# Patient Record
Sex: MALE | Race: WHITE | Employment: FULL TIME | URBAN - METROPOLITAN AREA
[De-identification: names, ages, dates, MRNs, and addresses within clinical notes are randomized per-mention and may not be internally consistent; named-entity substitution may affect disease eponyms.]

---

## 2018-04-27 ENCOUNTER — TELEPHONE (OUTPATIENT)
Dept: INTERNAL MEDICINE | Age: 38
End: 2018-04-27

## 2018-04-27 DIAGNOSIS — E55.9 VITAMIN D DEFICIENCY: ICD-10-CM

## 2018-04-27 DIAGNOSIS — Z00.00 WELL ADULT HEALTH CHECK: Primary | ICD-10-CM

## 2018-05-01 DIAGNOSIS — Z00.00 WELL ADULT HEALTH CHECK: ICD-10-CM

## 2018-05-01 DIAGNOSIS — E55.9 VITAMIN D DEFICIENCY: ICD-10-CM

## 2018-05-01 LAB
A/G RATIO: 1.6 (ref 1.1–2.2)
ALBUMIN SERPL-MCNC: 4.5 G/DL (ref 3.4–5)
ALP BLD-CCNC: 74 U/L (ref 40–129)
ALT SERPL-CCNC: 22 U/L (ref 10–40)
ANION GAP SERPL CALCULATED.3IONS-SCNC: 16 MMOL/L (ref 3–16)
AST SERPL-CCNC: 23 U/L (ref 15–37)
BASOPHILS ABSOLUTE: 0 K/UL (ref 0–0.2)
BASOPHILS RELATIVE PERCENT: 0.5 %
BILIRUB SERPL-MCNC: 0.5 MG/DL (ref 0–1)
BILIRUBIN URINE: NEGATIVE
BLOOD, URINE: NEGATIVE
BUN BLDV-MCNC: 15 MG/DL (ref 7–20)
CALCIUM SERPL-MCNC: 9.7 MG/DL (ref 8.3–10.6)
CHLORIDE BLD-SCNC: 103 MMOL/L (ref 99–110)
CHOLESTEROL, TOTAL: 236 MG/DL (ref 0–199)
CLARITY: CLEAR
CO2: 25 MMOL/L (ref 21–32)
COLOR: YELLOW
CREAT SERPL-MCNC: 0.9 MG/DL (ref 0.9–1.3)
EOSINOPHILS ABSOLUTE: 0.2 K/UL (ref 0–0.6)
EOSINOPHILS RELATIVE PERCENT: 2.7 %
GFR AFRICAN AMERICAN: >60
GFR NON-AFRICAN AMERICAN: >60
GLOBULIN: 2.8 G/DL
GLUCOSE BLD-MCNC: 101 MG/DL (ref 70–99)
GLUCOSE URINE: NEGATIVE MG/DL
HCT VFR BLD CALC: 43.6 % (ref 40.5–52.5)
HDLC SERPL-MCNC: 49 MG/DL (ref 40–60)
HEMOGLOBIN: 15.3 G/DL (ref 13.5–17.5)
KETONES, URINE: NEGATIVE MG/DL
LDL CHOLESTEROL CALCULATED: 165 MG/DL
LEUKOCYTE ESTERASE, URINE: NEGATIVE
LYMPHOCYTES ABSOLUTE: 2.4 K/UL (ref 1–5.1)
LYMPHOCYTES RELATIVE PERCENT: 42 %
MCH RBC QN AUTO: 31.2 PG (ref 26–34)
MCHC RBC AUTO-ENTMCNC: 35.1 G/DL (ref 31–36)
MCV RBC AUTO: 88.8 FL (ref 80–100)
MICROSCOPIC EXAMINATION: NORMAL
MONOCYTES ABSOLUTE: 0.4 K/UL (ref 0–1.3)
MONOCYTES RELATIVE PERCENT: 7.5 %
NEUTROPHILS ABSOLUTE: 2.7 K/UL (ref 1.7–7.7)
NEUTROPHILS RELATIVE PERCENT: 47.3 %
NITRITE, URINE: NEGATIVE
PDW BLD-RTO: 14 % (ref 12.4–15.4)
PH UA: 7
PLATELET # BLD: 248 K/UL (ref 135–450)
PMV BLD AUTO: 8.5 FL (ref 5–10.5)
POTASSIUM SERPL-SCNC: 4.8 MMOL/L (ref 3.5–5.1)
PROTEIN UA: NEGATIVE MG/DL
RBC # BLD: 4.91 M/UL (ref 4.2–5.9)
SODIUM BLD-SCNC: 144 MMOL/L (ref 136–145)
SPECIFIC GRAVITY UA: 1.02
TOTAL PROTEIN: 7.3 G/DL (ref 6.4–8.2)
TRIGL SERPL-MCNC: 111 MG/DL (ref 0–150)
TSH SERPL DL<=0.05 MIU/L-ACNC: 1.4 UIU/ML (ref 0.27–4.2)
URINE TYPE: NORMAL
UROBILINOGEN, URINE: 0.2 E.U./DL
VITAMIN D 25-HYDROXY: 32.4 NG/ML
VLDLC SERPL CALC-MCNC: 22 MG/DL
WBC # BLD: 5.7 K/UL (ref 4–11)

## 2018-05-10 ENCOUNTER — OFFICE VISIT (OUTPATIENT)
Dept: INTERNAL MEDICINE | Age: 38
End: 2018-05-10

## 2018-05-10 VITALS
HEIGHT: 74 IN | DIASTOLIC BLOOD PRESSURE: 86 MMHG | SYSTOLIC BLOOD PRESSURE: 122 MMHG | BODY MASS INDEX: 29.26 KG/M2 | WEIGHT: 228 LBS

## 2018-05-10 DIAGNOSIS — Z00.00 ANNUAL PHYSICAL EXAM: Primary | ICD-10-CM

## 2018-05-10 DIAGNOSIS — L40.9 PSORIASIS: ICD-10-CM

## 2018-05-10 DIAGNOSIS — E79.0 ELEVATED URIC ACID IN BLOOD: ICD-10-CM

## 2018-05-10 DIAGNOSIS — J30.1 CHRONIC SEASONAL ALLERGIC RHINITIS DUE TO POLLEN: ICD-10-CM

## 2018-05-10 DIAGNOSIS — E78.00 PURE HYPERCHOLESTEROLEMIA: ICD-10-CM

## 2018-05-10 PROCEDURE — 99395 PREV VISIT EST AGE 18-39: CPT | Performed by: HOSPITALIST

## 2018-05-10 ASSESSMENT — PATIENT HEALTH QUESTIONNAIRE - PHQ9
2. FEELING DOWN, DEPRESSED OR HOPELESS: 0
SUM OF ALL RESPONSES TO PHQ QUESTIONS 1-9: 0
2. FEELING DOWN, DEPRESSED OR HOPELESS: 0
SUM OF ALL RESPONSES TO PHQ QUESTIONS 1-9: 0
SUM OF ALL RESPONSES TO PHQ9 QUESTIONS 1 & 2: 0
SUM OF ALL RESPONSES TO PHQ9 QUESTIONS 1 & 2: 0
1. LITTLE INTEREST OR PLEASURE IN DOING THINGS: 0
1. LITTLE INTEREST OR PLEASURE IN DOING THINGS: 0

## 2018-05-10 ASSESSMENT — ENCOUNTER SYMPTOMS
GASTROINTESTINAL NEGATIVE: 1
RESPIRATORY NEGATIVE: 1
EYES NEGATIVE: 1

## 2019-05-28 ENCOUNTER — OFFICE VISIT (OUTPATIENT)
Dept: INTERNAL MEDICINE CLINIC | Age: 39
End: 2019-05-28
Payer: COMMERCIAL

## 2019-05-28 VITALS
DIASTOLIC BLOOD PRESSURE: 88 MMHG | BODY MASS INDEX: 28.88 KG/M2 | SYSTOLIC BLOOD PRESSURE: 120 MMHG | HEIGHT: 74 IN | WEIGHT: 225 LBS

## 2019-05-28 DIAGNOSIS — S41.111D LACERATION OF RIGHT UPPER EXTREMITY, SUBSEQUENT ENCOUNTER: Primary | ICD-10-CM

## 2019-05-28 PROCEDURE — 99213 OFFICE O/P EST LOW 20 MIN: CPT | Performed by: INTERNAL MEDICINE

## 2019-05-28 ASSESSMENT — PATIENT HEALTH QUESTIONNAIRE - PHQ9
1. LITTLE INTEREST OR PLEASURE IN DOING THINGS: 0
SUM OF ALL RESPONSES TO PHQ QUESTIONS 1-9: 0
SUM OF ALL RESPONSES TO PHQ QUESTIONS 1-9: 0
SUM OF ALL RESPONSES TO PHQ9 QUESTIONS 1 & 2: 0
2. FEELING DOWN, DEPRESSED OR HOPELESS: 0

## 2019-05-28 NOTE — PROGRESS NOTES
CHIEF COMPLAINT: Brittany Esteban is a 42 y.o. male who presents for : Suture removal    HPI: Patient presented with follow-up of a laceration of his right forearm he had sutures placed he now is 12 days out for suture removal there is no fever chills or evidence of infection    Review of Systems:   Constitutional:  Denies fever or chills   Eyes:  Denies change in visual acuity   HENT:  Denies nasal congestion or sore throat   Respiratory:  Denies cough or shortness of breath   Cardiovascular:  Denies chest pain or edema   GI:  Denies abdominal pain, nausea, vomiting, bloody stools or diarrhea   :  Denies dysuria   Musculoskeletal:  Denies back pain or joint pain   Integument:  Denies rash   Neurologic:  Denies headache, focal weakness or sensory changes   Endocrine:  Denies polyuria or polydipsia   Lymphatic:  Denies swollen glands   Psychiatric:  Denies depression or anxiety     Past Medical History:        Diagnosis Date    Arthritis of knee, left 5/29/2012    Psoriasis        Past Surgical History:        Procedure Laterality Date    KNEE ARTHROSCOPY Left        Family History:  Family History   Problem Relation Age of Onset    Heart Disease Father     High Cholesterol Father     High Blood Pressure Father     High Blood Pressure Brother     Other Father         impaired fasting glucose       Social History:  Social History     Socioeconomic History    Marital status:      Spouse name: None    Number of children: 2    Years of education: None    Highest education level: None   Occupational History    None   Social Needs    Financial resource strain: None    Food insecurity:     Worry: None     Inability: None    Transportation needs:     Medical: None     Non-medical: None   Tobacco Use    Smoking status: Never Smoker    Smokeless tobacco: Never Used   Substance and Sexual Activity    Alcohol use:  Yes     Alcohol/week: 10.8 oz     Types: 4 Cans of beer, 14 Standard drinks or equivalent per week    Drug use: None    Sexual activity: Yes     Partners: Female   Lifestyle    Physical activity:     Days per week: None     Minutes per session: None    Stress: None   Relationships    Social connections:     Talks on phone: None     Gets together: None     Attends Sabianist service: None     Active member of club or organization: None     Attends meetings of clubs or organizations: None     Relationship status: None    Intimate partner violence:     Fear of current or ex partner: None     Emotionally abused: None     Physically abused: None     Forced sexual activity: None   Other Topics Concern    None   Social History Narrative    None         Allergies:  Patient has no known allergies. Current Medications:    Prior to Admission medications    Medication Sig Start Date End Date Taking? Authorizing Provider   Flaxseed, Linseed, (FLAXSEED OIL PO) Take by mouth   Yes Historical Provider, MD   Multiple Vitamins-Minerals (MULTIVITAMIN PO) Take by mouth   Yes Historical Provider, MD       Physical Exam:  Vital Signs: /88   Ht 6' 1.5\" (1.867 m)   Wt 225 lb (102.1 kg)   BMI 29.28 kg/m²   General: Patient appears  non-toxic  HENT: Atraumatic, normocephalic, oral mucosa moist  Lungs:  Clear bilaterally  Heart: Regular rate and rhythm  Abdomen: Non-distended, soft, non-tender  Extremities: No edema well he had a laceration of the right forearm  Neuro: Nonfocal    Medical Decision Making and Plan:  Pertinent Labs & Imaging studies reviewed.  (See chart for details)      Sutures were removed in the area was surgery Steri-Stripped as there is evidence of a slight And he was instructed to keep the Steri-Strips on for at least a week

## 2020-06-01 ENCOUNTER — TELEPHONE (OUTPATIENT)
Dept: INTERNAL MEDICINE CLINIC | Age: 40
End: 2020-06-01

## 2020-06-01 NOTE — TELEPHONE ENCOUNTER
The patient will be going to the lab and requesting orders, Plz advise    [facility]    [phone]

## 2020-06-02 LAB
A/G RATIO: 1.5 (ref 1–2.1)
ALBUMIN SERPL-MCNC: 4.1 G/DL (ref 3.5–5)
ALP BLD-CCNC: 72 U/L (ref 33–140)
ALT SERPL-CCNC: 22 U/L (ref 0–60)
ANION GAP SERPL CALCULATED.3IONS-SCNC: 9 MMOL/L (ref 5–13)
AST SERPL-CCNC: 24 U/L (ref 0–40)
BASOPHILS ABSOLUTE: 0 10*3/UL (ref 0–0.2)
BASOPHILS RELATIVE PERCENT: 0.3 %
BILIRUB SERPL-MCNC: 0.7 MG/DL (ref 0.2–1.2)
BILIRUBIN URINE: NEGATIVE
BUN / CREAT RATIO: 11
BUN BLDV-MCNC: 12 MG/DL (ref 7–25)
CALCIUM SERPL-MCNC: 9.2 MG/DL (ref 8.4–10.5)
CHLORIDE BLD-SCNC: 107 MMOL/L (ref 98–110)
CHOLESTEROL, TOTAL: 229 MG/DL (ref 125–199)
CHOLESTEROL/HDL RATIO: 4.6 (ref 0–5)
CLARITY: CLEAR
CO2: 23 MMOL/L (ref 22–29)
COLOR: YELLOW
CREAT SERPL-MCNC: 1.1 MG/DL (ref 0.5–1.3)
EOSINOPHILS ABSOLUTE: 0.2 10*3/UL (ref 0–0.5)
EOSINOPHILS RELATIVE PERCENT: 4 %
ERYTHROCYTES URINE: NEGATIVE
GFR AFRICAN AMERICAN: 91 SEE NOTE
GFR NON-AFRICAN AMERICAN: 75 SEE NOTE
GLOBULIN: 2.7 G/DL (ref 2–3.7)
GLUCOSE BLD-MCNC: 105 MG/DL (ref 70–99)
GLUCOSE URINE: NEGATIVE MG/DL
GRANULOCYTE ABSOLUTE COUNT: 2.3 10*3/UL (ref 1.5–7.8)
HCT VFR BLD CALC: 41.6 % (ref 38.5–50)
HDLC SERPL-MCNC: 50 MG/DL (ref 40–180)
HEMOGLOBIN: 14.6 G/DL (ref 13.2–17.1)
KETONES, URINE: NEGATIVE MG/DL
LDL CHOLESTEROL CALCULATED: 156 MG/DL (ref 0–100)
LEUKOCYTE ESTERASE, URINE: NEGATIVE
LYMPHOCYTES ABSOLUTE: 2.1 10*3/UL (ref 0.8–3.9)
LYMPHOCYTES RELATIVE PERCENT: 42.8 %
MCH RBC QN AUTO: 31.8 PG (ref 27–33)
MCHC RBC AUTO-ENTMCNC: 35.2 G/DL (ref 32–36)
MCV RBC AUTO: 90.4 FL (ref 80–100)
MONOCYTES ABSOLUTE: 0.3 10*3/UL (ref 0.2–0.9)
MONOCYTES RELATIVE PERCENT: 6.7 %
NITRITE, URINE: NEGATIVE
PDW BLD-RTO: 13.7 % (ref 11–15)
PH UA: 7 (ref 5–8)
PLATELET # BLD: 225 10*3/UL (ref 140–400)
PMV BLD AUTO: 8.3 FL (ref 7.5–11.5)
POTASSIUM SERPL-SCNC: 4.5 MMOL/L (ref 3.5–5.1)
PROTEIN UA: NEGATIVE MG/DL
RBC # BLD: 4.6 10*6/UL (ref 4.2–5.8)
SEGMENTED NEUTROPHILS RELATIVE PERCENT: 46.2 %
SODIUM BLD-SCNC: 139 MMOL/L (ref 135–146)
SPECIFIC GRAVITY UA: 1.02 (ref 1–1.03)
TOTAL PROTEIN: 6.8 G/DL (ref 6–8)
TRIGL SERPL-MCNC: 115 MG/DL (ref 0–150)
TSH ULTRASENSITIVE: 1.54 UIU/ML (ref 0.35–4.94)
UROBILINOGEN, URINE: <2 MG/DL
WBC # BLD: 5 10*3/UL (ref 3.8–10.8)

## 2020-06-04 ENCOUNTER — OFFICE VISIT (OUTPATIENT)
Dept: INTERNAL MEDICINE CLINIC | Age: 40
End: 2020-06-04
Payer: COMMERCIAL

## 2020-06-04 VITALS
WEIGHT: 228 LBS | TEMPERATURE: 97.8 F | HEART RATE: 61 BPM | SYSTOLIC BLOOD PRESSURE: 125 MMHG | HEIGHT: 75 IN | BODY MASS INDEX: 28.35 KG/M2 | DIASTOLIC BLOOD PRESSURE: 80 MMHG

## 2020-06-04 PROBLEM — E78.00 PURE HYPERCHOLESTEROLEMIA: Status: ACTIVE | Noted: 2020-06-04

## 2020-06-04 PROBLEM — R73.9 HYPERGLYCEMIA: Status: ACTIVE | Noted: 2020-06-04

## 2020-06-04 PROCEDURE — 99395 PREV VISIT EST AGE 18-39: CPT | Performed by: INTERNAL MEDICINE

## 2020-06-04 ASSESSMENT — PATIENT HEALTH QUESTIONNAIRE - PHQ9
SUM OF ALL RESPONSES TO PHQ QUESTIONS 1-9: 0
SUM OF ALL RESPONSES TO PHQ QUESTIONS 1-9: 0
1. LITTLE INTEREST OR PLEASURE IN DOING THINGS: 0
2. FEELING DOWN, DEPRESSED OR HOPELESS: 0
SUM OF ALL RESPONSES TO PHQ9 QUESTIONS 1 & 2: 0

## 2020-06-04 NOTE — PROGRESS NOTES
Annual Wellness Visit     Patient:  Marcus Khan                                               : 1980  Age: 47 y.o. MRN: <[id number]>  Date : 2020      CHIEF COMPLAINT: Marcus Khan is a 47 y.o. male who presents for : Physical exam    1. Pure hypercholesterolemia  Problem is stable continues to watch diet and exercise  - Internal Referral to Dietitian    2.  Hyperglycemia  Diet and exercise  See dietitian  - Internal Referral to Dietitian  Physical exam feels good denies any chest pain shortness of breath or any      Patient Active Problem List    Diagnosis Date Noted    Pure hypercholesterolemia 2020    Hyperglycemia 2020    Vitamin D deficiency 2012    Gout 2012    Arthritis of knee, left 2012    Psoriasis 2011       Constitutional:  Denies fever or chills   Eyes:  Denies change in visual acuity   HENT:  Denies nasal congestion or sore throat   Respiratory:  Denies cough or shortness of breath   Cardiovascular:  Denies chest pain or edema   GI:  Denies abdominal pain, nausea, vomiting, bloody stools or diarrhea   :  Denies dysuria   Musculoskeletal:  Denies back pain or joint pain   Integument:  Denies rash   Neurologic:  Denies headache, focal weakness or sensory changes   Endocrine:  Denies polyuria or polydipsia   Lymphatic:  Denies swollen glands   Psychiatric:  Denies depression or anxiety     Past Medical History:        Diagnosis Date    Arthritis of knee, left 2012    Hyperglycemia 2020    Psoriasis     Pure hypercholesterolemia 2020       Past Surgical History:        Procedure Laterality Date    KNEE ARTHROSCOPY Left     VASECTOMY         Family History:  Family History   Problem Relation Age of Onset    Heart Disease Father     High Cholesterol Father     High Blood Pressure Father     High Blood Pressure Brother     Other Father         impaired fasting glucose       Social History:  Social History     Socioeconomic History    Marital status:      Spouse name: None    Number of children: 2    Years of education: None    Highest education level: None   Occupational History    None   Social Needs    Financial resource strain: None    Food insecurity     Worry: None     Inability: None    Transportation needs     Medical: None     Non-medical: None   Tobacco Use    Smoking status: Never Smoker    Smokeless tobacco: Never Used   Substance and Sexual Activity    Alcohol use: Yes     Alcohol/week: 18.0 standard drinks     Types: 4 Cans of beer, 14 Standard drinks or equivalent per week    Drug use: None    Sexual activity: Yes     Partners: Female   Lifestyle    Physical activity     Days per week: None     Minutes per session: None    Stress: None   Relationships    Social connections     Talks on phone: None     Gets together: None     Attends Pentecostalism service: None     Active member of club or organization: None     Attends meetings of clubs or organizations: None     Relationship status: None    Intimate partner violence     Fear of current or ex partner: None     Emotionally abused: None     Physically abused: None     Forced sexual activity: None   Other Topics Concern    None   Social History Narrative    None         Allergies:  Patient has no known allergies. Current Medications:    Prior to Admission medications    Medication Sig Start Date End Date Taking? Authorizing Provider   Multiple Vitamins-Minerals (MULTIVITAMIN PO) Take by mouth   Yes Historical Provider, MD           Physical Exam:      Constitutional:  Well developed, well nourished, no acute distress, non-toxic appearance   Eyes:  PERRL, conjunctiva normal   HENT:  Atraumatic, external ears normal, nose normal, oropharynx moist, no pharyngeal exudates.  Neck- normal range of motion, no tenderness, supple   Respiratory:  No respiratory distress, normal breath sounds, no rales, no wheezing   Cardiovascular:  Normal rate, normal rhythm, no murmurs, no gallops, no rubs   GI:  Soft, nondistended, normal bowel sounds, nontender, no organomegaly, no mass, no rebound, no guarding   :  No costovertebral angle tenderness   Musculoskeletal:  No edema, no tenderness, no deformities. Back- no tenderness  Integument:  Well hydrated, no rash   Lymphatic:  No lymphadenopathy noted   Neurologic:  Alert & oriented x 3, CN 2-12 normal, normal motor function, normal sensory function, no focal deficits noted   Psychiatric:  Speech and behavior appropriate       Vitals: /80   Pulse 61   Temp 97.8 °F (36.6 °C)   Ht 6' 2.5\" (1.892 m)   Wt 228 lb (103.4 kg)   BMI 28.88 kg/m²     Body mass index is 28.88 kg/m². Wt Readings from Last 3 Encounters:   06/04/20 228 lb (103.4 kg)   05/28/19 225 lb (102.1 kg)   05/10/18 228 lb (103.4 kg)         LABS:    CBC:   Lab Results   Component Value Date    WBC 5.0 06/02/2020    HGB 14.6 06/02/2020    HCT 41.6 06/02/2020    MCV 90.4 06/02/2020     06/02/2020         No results found for: IRON, TIBC, FERRITIN, FOLATE, [id number], PTH                                                          BMP:    Lab Results   Component Value Date     06/02/2020    K 4.5 06/02/2020     06/02/2020    CO2 23 06/02/2020       LFT's:   Lab Results   Component Value Date    ALT 22 06/02/2020    AST 24 06/02/2020    ALKPHOS 72 06/02/2020    BILITOT 0.7 06/02/2020       Lipids:   Lab Results   Component Value Date    CHOL 229 (H) 06/02/2020    HDL 50 06/02/2020    LDLCALC 156 (H) 06/02/2020    TRIG 115 06/02/2020       INR: No results found for: INR, PROTIME    U/A:  Lab Results   Component Value Date    LABMICR Not Indicated 05/01/2018        No results found for: LABA1C     Lab Results   Component Value Date    CREATININE 1.10 06/02/2020       -----------------------------------------------------------------     Assessment/Plan:   1.  Pure hypercholesterolemia  Continue diet and exercise  - Internal Referral to Dietitian    2.  Hyperglycemia  Continue diet and exercise referral to dietitian  - Internal Referral to Dietitian  Physical exam check above labs continue diet and exercise  Up-to-date on his immunizations

## 2021-06-01 DIAGNOSIS — Z00.00 WELL ADULT EXAM: Primary | ICD-10-CM

## 2021-06-03 LAB
A/G RATIO: 1.3 (ref 1–2.1)
ALBUMIN SERPL-MCNC: 4.1 G/DL (ref 3.5–5)
ALP BLD-CCNC: 77 U/L (ref 33–140)
ALT SERPL-CCNC: 22 U/L (ref 0–60)
ANION GAP SERPL CALCULATED.3IONS-SCNC: 9 MMOL/L (ref 5–13)
AST SERPL-CCNC: 21 U/L (ref 0–40)
BASOPHILS ABSOLUTE: 0 10*3/UL (ref 0–0.2)
BASOPHILS RELATIVE PERCENT: 0.4 %
BILIRUB SERPL-MCNC: 0.5 MG/DL (ref 0.2–1.2)
BUN / CREAT RATIO: 12
BUN BLDV-MCNC: 13 MG/DL (ref 7–25)
CALCIUM SERPL-MCNC: 9.2 MG/DL (ref 8.4–10.5)
CHLORIDE BLD-SCNC: 105 MMOL/L (ref 98–110)
CHOLESTEROL, TOTAL: 201 MG/DL (ref 125–199)
CHOLESTEROL/HDL RATIO: 3.9 (ref 0–5)
CO2: 27 MMOL/L (ref 22–29)
CREAT SERPL-MCNC: 1.09 MG/DL (ref 0.5–1.3)
EOSINOPHILS ABSOLUTE: 0.2 10*3/UL (ref 0–0.5)
EOSINOPHILS RELATIVE PERCENT: 3.5 %
GFR AFRICAN AMERICAN: 91 SEE NOTE
GFR NON-AFRICAN AMERICAN: 75 SEE NOTE
GLOBULIN: 3.1 G/DL (ref 2–3.7)
GLUCOSE BLD-MCNC: 92 MG/DL (ref 71–99)
GRANULOCYTE ABSOLUTE COUNT: 2.5 10*3/UL (ref 1.5–7.8)
HCT VFR BLD CALC: 42.4 % (ref 38.5–50)
HDLC SERPL-MCNC: 51 MG/DL (ref 40–180)
HEMOGLOBIN: 14.7 G/DL (ref 13.2–17.1)
LDL CHOLESTEROL CALCULATED: 136 MG/DL (ref 0–100)
LYMPHOCYTES ABSOLUTE: 2.6 10*3/UL (ref 0.8–3.9)
LYMPHOCYTES RELATIVE PERCENT: 45.1 %
MCH RBC QN AUTO: 31.3 PG (ref 27–33)
MCHC RBC AUTO-ENTMCNC: 34.7 G/DL (ref 32–36)
MCV RBC AUTO: 90.3 FL (ref 80–100)
MONOCYTES ABSOLUTE: 0.4 10*3/UL (ref 0.2–0.9)
MONOCYTES RELATIVE PERCENT: 7.3 %
PDW BLD-RTO: 14 % (ref 11–15)
PLATELET # BLD: 271 10*3/UL (ref 140–400)
PMV BLD AUTO: 8.8 FL (ref 7.5–11.5)
POTASSIUM SERPL-SCNC: 4.5 MMOL/L (ref 3.5–5.1)
RBC # BLD: 4.7 10*6/UL (ref 4.2–5.8)
SEGMENTED NEUTROPHILS RELATIVE PERCENT: 43.7 %
SODIUM BLD-SCNC: 141 MMOL/L (ref 135–146)
TOTAL PROTEIN: 7.2 G/DL (ref 6–8)
TRIGL SERPL-MCNC: 71 MG/DL (ref 0–150)
TSH ULTRASENSITIVE: 1.49 UIU/ML (ref 0.35–4.94)
WBC # BLD: 5.8 10*3/UL (ref 3.8–10.8)

## 2021-06-07 ENCOUNTER — OFFICE VISIT (OUTPATIENT)
Dept: INTERNAL MEDICINE CLINIC | Age: 41
End: 2021-06-07
Payer: COMMERCIAL

## 2021-06-07 VITALS
SYSTOLIC BLOOD PRESSURE: 114 MMHG | HEART RATE: 72 BPM | DIASTOLIC BLOOD PRESSURE: 72 MMHG | BODY MASS INDEX: 29 KG/M2 | HEIGHT: 74 IN | WEIGHT: 226 LBS | OXYGEN SATURATION: 99 %

## 2021-06-07 DIAGNOSIS — E78.00 PURE HYPERCHOLESTEROLEMIA: ICD-10-CM

## 2021-06-07 DIAGNOSIS — L40.9 PSORIASIS: ICD-10-CM

## 2021-06-07 DIAGNOSIS — Z00.00 PE (PHYSICAL EXAM), ANNUAL: Primary | ICD-10-CM

## 2021-06-07 PROBLEM — R73.9 HYPERGLYCEMIA: Status: RESOLVED | Noted: 2020-06-04 | Resolved: 2021-06-07

## 2021-06-07 PROCEDURE — 99395 PREV VISIT EST AGE 18-39: CPT | Performed by: INTERNAL MEDICINE

## 2021-06-07 SDOH — ECONOMIC STABILITY: FOOD INSECURITY: WITHIN THE PAST 12 MONTHS, YOU WORRIED THAT YOUR FOOD WOULD RUN OUT BEFORE YOU GOT MONEY TO BUY MORE.: NEVER TRUE

## 2021-06-07 SDOH — ECONOMIC STABILITY: FOOD INSECURITY: WITHIN THE PAST 12 MONTHS, THE FOOD YOU BOUGHT JUST DIDN'T LAST AND YOU DIDN'T HAVE MONEY TO GET MORE.: NEVER TRUE

## 2021-06-07 ASSESSMENT — PATIENT HEALTH QUESTIONNAIRE - PHQ9
SUM OF ALL RESPONSES TO PHQ QUESTIONS 1-9: 0
2. FEELING DOWN, DEPRESSED OR HOPELESS: 0
SUM OF ALL RESPONSES TO PHQ QUESTIONS 1-9: 0
SUM OF ALL RESPONSES TO PHQ QUESTIONS 1-9: 0
1. LITTLE INTEREST OR PLEASURE IN DOING THINGS: 0
SUM OF ALL RESPONSES TO PHQ9 QUESTIONS 1 & 2: 0

## 2021-06-07 ASSESSMENT — SOCIAL DETERMINANTS OF HEALTH (SDOH): HOW HARD IS IT FOR YOU TO PAY FOR THE VERY BASICS LIKE FOOD, HOUSING, MEDICAL CARE, AND HEATING?: NOT HARD AT ALL

## 2021-06-07 NOTE — PROGRESS NOTES
Annual Wellness Visit     Patient:  Sakshi Haas                                               : 1980  Age: 46 y.o. MRN: <[id number]>  Date : 2021      CHIEF COMPLAINT: Sakshi Haas is a 46 y.o. male who presents for : Physical exam    1. Pure hypercholesterolemia  Watching his diet and exercising    2. Psoriasis  Problem is stable    3.  PE (physical exam), annual  Generally feels good denies any chest pain shortness of breath or any other problems does have occasional back pain which resolves with exercise        Patient Active Problem List    Diagnosis Date Noted    Pure hypercholesterolemia 2020    Vitamin D deficiency 2012    Arthritis of knee, left 2012    Psoriasis 2011       Constitutional:  Denies fever or chills   Eyes:  Denies change in visual acuity   HENT:  Denies nasal congestion or sore throat   Respiratory:  Denies cough or shortness of breath   Cardiovascular:  Denies chest pain or edema   GI:  Denies abdominal pain, nausea, vomiting, bloody stools or diarrhea   :  Denies dysuria   Musculoskeletal:  Denies back pain or joint pain   Integument:  Denies rash   Neurologic:  Denies headache, focal weakness or sensory changes   Endocrine:  Denies polyuria or polydipsia   Lymphatic:  Denies swollen glands   Psychiatric:  Denies depression or anxiety     Past Medical History:        Diagnosis Date    Arthritis of knee, left 2012    Hyperglycemia 2020    Psoriasis     Pure hypercholesterolemia 2020       Past Surgical History:        Procedure Laterality Date    KNEE ARTHROSCOPY Left     VASECTOMY         Family History:  Family History   Problem Relation Age of Onset    Heart Disease Father     High Cholesterol Father     High Blood Pressure Father     High Blood Pressure Brother     Other Father         impaired fasting glucose       Social History:  Social History     Socioeconomic History    Marital status:      Spouse name: None    Number of children: 2    Years of education: None    Highest education level: None   Occupational History    None   Tobacco Use    Smoking status: Never Smoker    Smokeless tobacco: Never Used   Substance and Sexual Activity    Alcohol use: Yes     Alcohol/week: 18.0 standard drinks     Types: 4 Cans of beer, 14 Standard drinks or equivalent per week    Drug use: None    Sexual activity: Yes     Partners: Female   Other Topics Concern    None   Social History Narrative    None     Social Determinants of Health     Financial Resource Strain: Low Risk     Difficulty of Paying Living Expenses: Not hard at all   Food Insecurity: No Food Insecurity    Worried About Running Out of Food in the Last Year: Never true    Rae of Food in the Last Year: Never true   Transportation Needs:     Lack of Transportation (Medical):  Lack of Transportation (Non-Medical):    Physical Activity:     Days of Exercise per Week:     Minutes of Exercise per Session:    Stress:     Feeling of Stress :    Social Connections:     Frequency of Communication with Friends and Family:     Frequency of Social Gatherings with Friends and Family:     Attends Hindu Services:     Active Member of Clubs or Organizations:     Attends Club or Organization Meetings:     Marital Status:    Intimate Partner Violence:     Fear of Current or Ex-Partner:     Emotionally Abused:     Physically Abused:     Sexually Abused: Allergies:  Patient has no known allergies. Current Medications:    Prior to Admission medications    Medication Sig Start Date End Date Taking?  Authorizing Provider   Multiple Vitamins-Minerals (MULTIVITAMIN PO) Take by mouth   Yes Historical Provider, MD           Physical Exam:      Constitutional:  Well developed, well nourished, no acute distress, non-toxic appearance   Eyes:  PERRL, conjunctiva normal   HENT:  Atraumatic, external ears normal, nose normal, oropharynx moist, no pharyngeal exudates. Neck- normal range of motion, no tenderness, supple   Respiratory:  No respiratory distress, normal breath sounds, no rales, no wheezing   Cardiovascular:  Normal rate, normal rhythm, no murmurs, no gallops, no rubs   GI:  Soft, nondistended, normal bowel sounds, nontender, no organomegaly, no mass, no rebound, no guarding   :  No costovertebral angle tenderness   Musculoskeletal:  No edema, no tenderness, no deformities. Back- no tenderness  Integument:  Well hydrated, no rash   Lymphatic:  No lymphadenopathy noted   Neurologic:  Alert & oriented x 3, CN 2-12 normal, normal motor function, normal sensory function, no focal deficits noted   Psychiatric:  Speech and behavior appropriate       Vitals: /72   Pulse 72   Ht 6' 2\" (1.88 m)   Wt 226 lb (102.5 kg)   SpO2 99%   BMI 29.02 kg/m²     Body mass index is 29.02 kg/m².   Wt Readings from Last 3 Encounters:   06/07/21 226 lb (102.5 kg)   06/04/20 228 lb (103.4 kg)   05/28/19 225 lb (102.1 kg)         LABS:    CBC:   Lab Results   Component Value Date    WBC 5.8 06/03/2021    HGB 14.7 06/03/2021    HCT 42.4 06/03/2021    MCV 90.3 06/03/2021     06/03/2021         No results found for: IRON, TIBC, FERRITIN, FOLATE, [id number], PTH                                                          BMP:    Lab Results   Component Value Date     06/03/2021    K 4.5 06/03/2021     06/03/2021    CO2 27 06/03/2021       LFT's:   Lab Results   Component Value Date    ALT 22 06/03/2021    AST 21 06/03/2021    ALKPHOS 77 06/03/2021    BILITOT 0.5 06/03/2021       Lipids:   Lab Results   Component Value Date    CHOL 201 (H) 06/03/2021    HDL 51 06/03/2021    LDLCALC 136 (H) 06/03/2021    TRIG 71 06/03/2021       INR: No results found for: INR, PROTIME    U/A:  Lab Results   Component Value Date    LABMICR Not Indicated 05/01/2018        No results found for: LABA1C     Lab Results   Component Value Date    CREATININE 1.09 06/03/2021

## 2022-05-23 ENCOUNTER — TELEPHONE (OUTPATIENT)
Dept: INTERNAL MEDICINE CLINIC | Age: 42
End: 2022-05-23

## 2022-05-23 DIAGNOSIS — Z00.00 PE (PHYSICAL EXAM), ANNUAL: Primary | ICD-10-CM

## 2022-05-23 NOTE — TELEPHONE ENCOUNTER
----- Message from Maureen Aguillon sent at 5/23/2022 12:51 PM EDT -----  Subject: Referral Request    QUESTIONS   Reason for referral request? pt needs labs sent [phone]   prior to appt on 06/15   Has the physician seen you for this condition before? No   Preferred Specialist (if applicable)? Do you already have an appointment scheduled? Additional Information for Provider?   ---------------------------------------------------------------------------  --------------  CALL BACK INFO  What is the best way for the office to contact you? OK to leave message on   voicemail  Preferred Call Back Phone Number? [phone]  ---------------------------------------------------------------------------  --------------  SCRIPT ANSWERS  Relationship to Patient?  Self

## 2022-06-15 ENCOUNTER — OFFICE VISIT (OUTPATIENT)
Dept: INTERNAL MEDICINE CLINIC | Age: 42
End: 2022-06-15
Payer: COMMERCIAL

## 2022-06-15 VITALS
WEIGHT: 220 LBS | SYSTOLIC BLOOD PRESSURE: 118 MMHG | HEIGHT: 75 IN | DIASTOLIC BLOOD PRESSURE: 84 MMHG | HEART RATE: 74 BPM | OXYGEN SATURATION: 97 % | BODY MASS INDEX: 27.35 KG/M2 | TEMPERATURE: 98.2 F

## 2022-06-15 DIAGNOSIS — Z00.00 PE (PHYSICAL EXAM), ANNUAL: Primary | ICD-10-CM

## 2022-06-15 DIAGNOSIS — L40.9 PSORIASIS: ICD-10-CM

## 2022-06-15 PROCEDURE — 99396 PREV VISIT EST AGE 40-64: CPT | Performed by: INTERNAL MEDICINE

## 2022-06-15 RX ORDER — ASCORBIC ACID 500 MG
1000 TABLET ORAL DAILY
COMMUNITY

## 2022-06-15 SDOH — ECONOMIC STABILITY: FOOD INSECURITY: WITHIN THE PAST 12 MONTHS, THE FOOD YOU BOUGHT JUST DIDN'T LAST AND YOU DIDN'T HAVE MONEY TO GET MORE.: NEVER TRUE

## 2022-06-15 SDOH — ECONOMIC STABILITY: FOOD INSECURITY: WITHIN THE PAST 12 MONTHS, YOU WORRIED THAT YOUR FOOD WOULD RUN OUT BEFORE YOU GOT MONEY TO BUY MORE.: NEVER TRUE

## 2022-06-15 ASSESSMENT — PATIENT HEALTH QUESTIONNAIRE - PHQ9
SUM OF ALL RESPONSES TO PHQ QUESTIONS 1-9: 0
SUM OF ALL RESPONSES TO PHQ QUESTIONS 1-9: 0
SUM OF ALL RESPONSES TO PHQ9 QUESTIONS 1 & 2: 0
SUM OF ALL RESPONSES TO PHQ QUESTIONS 1-9: 0
SUM OF ALL RESPONSES TO PHQ QUESTIONS 1-9: 0
1. LITTLE INTEREST OR PLEASURE IN DOING THINGS: 0
2. FEELING DOWN, DEPRESSED OR HOPELESS: 0

## 2022-06-15 ASSESSMENT — SOCIAL DETERMINANTS OF HEALTH (SDOH): HOW HARD IS IT FOR YOU TO PAY FOR THE VERY BASICS LIKE FOOD, HOUSING, MEDICAL CARE, AND HEATING?: NOT HARD AT ALL

## 2022-06-15 NOTE — PROGRESS NOTES
Annual Wellness Visit     Patient:  Tim Whelan                                               : 1980  Age: 38 y.o.   MRN: [id number]  Date : 6/15/2022      CHIEF COMPLAINT: Tim Whelan is a 38 y.o. male who presents for : Physical exam    Generally feels good continues to exercise regularly denies any chest pain shortness of breath or any other problem    Patient Active Problem List    Diagnosis Date Noted    Pure hypercholesterolemia 2020    Vitamin D deficiency 2012    Arthritis of knee, left 2012    Psoriasis 2011       Constitutional:  Denies fever or chills   Eyes:  Denies change in visual acuity   HENT:  Denies nasal congestion or sore throat   Respiratory:  Denies cough or shortness of breath   Cardiovascular:  Denies chest pain or edema   GI:  Denies abdominal pain, nausea, vomiting, bloody stools or diarrhea   :  Denies dysuria   Musculoskeletal:  Denies back pain or joint pain   Integument:  Denies rash   Neurologic:  Denies headache, focal weakness or sensory changes   Endocrine:  Denies polyuria or polydipsia   Lymphatic:  Denies swollen glands   Psychiatric:  Denies depression or anxiety     Past Medical History:        Diagnosis Date    Arthritis of knee, left 2012    Hyperglycemia 2020    Psoriasis     Pure hypercholesterolemia 2020       Past Surgical History:        Procedure Laterality Date    KNEE ARTHROSCOPY Left     VASECTOMY         Family History:  Family History   Problem Relation Age of Onset    Heart Disease Father     High Cholesterol Father     High Blood Pressure Father     High Blood Pressure Brother     Other Father         impaired fasting glucose       Social History:  Social History     Socioeconomic History    Marital status:      Spouse name: None    Number of children: 2    Years of education: None    Highest education level: None   Occupational History    None   Tobacco Use    Smoking status: Never Smoker    Smokeless tobacco: Never Used   Substance and Sexual Activity    Alcohol use: Yes     Alcohol/week: 18.0 standard drinks     Types: 4 Cans of beer, 14 Standard drinks or equivalent per week    Drug use: None    Sexual activity: Yes     Partners: Female   Other Topics Concern    None   Social History Narrative    None     Social Determinants of Health     Financial Resource Strain: Low Risk     Difficulty of Paying Living Expenses: Not hard at all   Food Insecurity: No Food Insecurity    Worried About Running Out of Food in the Last Year: Never true    Rae of Food in the Last Year: Never true   Transportation Needs:     Lack of Transportation (Medical): Not on file    Lack of Transportation (Non-Medical): Not on file   Physical Activity:     Days of Exercise per Week: Not on file    Minutes of Exercise per Session: Not on file   Stress:     Feeling of Stress : Not on file   Social Connections:     Frequency of Communication with Friends and Family: Not on file    Frequency of Social Gatherings with Friends and Family: Not on file    Attends Lutheran Services: Not on file    Active Member of [address] [place] [address] or Organizations: Not on file    Attends Club or Organization Meetings: Not on file    Marital Status: Not on file   Intimate Partner Violence:     Fear of Current or Ex-Partner: Not on file    Emotionally Abused: Not on file    Physically Abused: Not on file    Sexually Abused: Not on file   Housing Stability:     Unable to Pay for Housing in the Last Year: Not on file    Number of [place] in the Last Year: Not on file    Unstable Housing in the Last Year: Not on file         Allergies:  Patient has no known allergies. Current Medications:    Prior to Admission medications    Medication Sig Start Date End Date Taking?  Authorizing Provider   vitamin C (ASCORBIC ACID) 500 MG tablet Take 1,000 mg by mouth daily   Yes Historical Provider, MD   Omega-3 Fatty Acids (FISH OIL) 600 MG CAPS Take by mouth   Yes Historical Provider, MD   Multiple Vitamins-Minerals (MULTIVITAMIN PO) Take by mouth   Yes Historical Provider, MD           Physical Exam:      Constitutional:  Well developed, well nourished, no acute distress, non-toxic appearance   Eyes:  PERRL, conjunctiva normal   HENT:  Atraumatic, external ears normal, nose normal, oropharynx moist, no pharyngeal exudates. Neck- normal range of motion, no tenderness, supple   Respiratory:  No respiratory distress, normal breath sounds, no rales, no wheezing   Cardiovascular:  Normal rate, normal rhythm, no murmurs, no gallops, no rubs   GI:  Soft, nondistended, normal bowel sounds, nontender, no organomegaly, no mass, no rebound, no guarding   :  No costovertebral angle tenderness   Musculoskeletal:  No edema, no tenderness, no deformities. Back- no tenderness  Integument:  Well hydrated, no rash   Lymphatic:  No lymphadenopathy noted   Neurologic:  Alert & oriented x 3, CN 2-12 normal, normal motor function, normal sensory function, no focal deficits noted   Psychiatric:  Speech and behavior appropriate   Rectal exam reveals an normal prostate heme-negative stool    Vitals: /84   Pulse 74   Temp 98.2 °F (36.8 °C)   Ht 6' 2.5\" (1.892 m)   Wt 220 lb (99.8 kg)   SpO2 97%   BMI 27.87 kg/m²     Body mass index is 27.87 kg/m².   Wt Readings from Last 3 Encounters:   06/15/22 220 lb (99.8 kg)   06/07/21 226 lb (102.5 kg)   06/04/20 228 lb (103.4 kg)         LABS:    CBC:   Lab Results   Component Value Date    WBC 5.8 06/03/2021    HGB 14.7 06/03/2021    HCT 42.4 06/03/2021    MCV 90.3 06/03/2021     06/03/2021         No results found for: IRON, TIBC, FERRITIN, FOLATE, [id number], PTH                                                          BMP:    Lab Results   Component Value Date     06/03/2021    K 4.5 06/03/2021     06/03/2021    CO2 27 06/03/2021       LFT's:   Lab Results   Component Value Date    ALT 22 06/03/2021    AST 21 06/03/2021    ALKPHOS 77 06/03/2021    BILITOT 0.5 06/03/2021       Lipids:   Lab Results   Component Value Date    CHOL 201 (H) 06/03/2021    HDL 51 06/03/2021    LDLCALC 136 (H) 06/03/2021    TRIG 71 06/03/2021       INR: No results found for: INR, PROTIME    U/A:  Lab Results   Component Value Date    LABMICR Not Indicated 05/01/2018        No results found for: LABA1C     Lab Results   Component Value Date    CREATININE 1.09 06/03/2021       -----------------------------------------------------------------     Assessment/Plan:   Physical exam check screening labs continue diet and exercise he is to consider getting a COVID booster and will follow-up yearly  Psoriasis now under good good control without evidence of psoriatic arthritis continue topical  meds

## 2023-06-05 ENCOUNTER — TELEPHONE (OUTPATIENT)
Dept: INTERNAL MEDICINE CLINIC | Age: 43
End: 2023-06-05

## 2023-06-05 DIAGNOSIS — Z00.00 WELL ADULT EXAM: ICD-10-CM

## 2023-06-05 DIAGNOSIS — E78.00 PURE HYPERCHOLESTEROLEMIA: Primary | ICD-10-CM

## 2023-06-05 NOTE — TELEPHONE ENCOUNTER
----- Message from [facility] sent at 6/5/2023 10:58 AM EDT -----  Subject: Referral Request    Reason for referral request? [facility] [address]  Phone number is [phone]. Provider patient wants to be referred to(if known):     Provider Phone Number(if known):     Additional Information for Provider?   ---------------------------------------------------------------------------  --------------  [address] [employer]    [phone]; OK to leave message on voicemail  ---------------------------------------------------------------------------  --------------

## 2023-06-07 LAB
A/G RATIO: 1.4 (ref 1–2.1)
ALBUMIN SERPL-MCNC: 4.2 G/DL (ref 3.5–5)
ALP BLD-CCNC: 72 U/L (ref 33–140)
ALT SERPL-CCNC: 19 U/L (ref 0–60)
ANION GAP SERPL CALCULATED.3IONS-SCNC: 11 MMOL/L (ref 5–13)
AST SERPL-CCNC: 23 U/L (ref 0–40)
BASOPHILS ABSOLUTE: 0.02 10*3/UL (ref 0–0.2)
BASOPHILS RELATIVE PERCENT: 0.4 %
BILIRUB SERPL-MCNC: 0.4 MG/DL (ref 0.2–1.2)
BILIRUBIN URINE: NEGATIVE
BLOOD, URINE: NEGATIVE
BUN / CREAT RATIO: 15
BUN BLDV-MCNC: 14 MG/DL (ref 7–25)
CALCIUM SERPL-MCNC: 9.4 MG/DL (ref 8.5–10.5)
CHLORIDE BLD-SCNC: 107 MMOL/L (ref 98–110)
CHOLESTEROL, TOTAL: 213 MG/DL (ref 125–199)
CLARITY: CLEAR
CO2: 22 MMOL/L (ref 22–29)
COLOR: YELLOW
CREAT SERPL-MCNC: 0.96 MG/DL (ref 0.5–1.3)
EGFR (CKD-EPI): 102 SEE NOTE
EOSINOPHILS ABSOLUTE: 0.19 10*3/UL (ref 0–0.5)
EOSINOPHILS RELATIVE PERCENT: 3.6 %
EPITHELIAL CELLS, UA: ABNORMAL /HPF (ref 0–5)
ERYTHROCYTES, NUCLEATED/100 LEU: 0 /100 WBC (ref 0–0)
GLOBULIN: 2.9 G/DL (ref 2–3.7)
GLUCOSE TOLERANCE TEST FASTING: 87 MG/DL (ref 71–99)
GLUCOSE URINE: NEGATIVE MG/DL
HCT VFR BLD CALC: 45 % (ref 40–51)
HDLC SERPL-MCNC: 44 MG/DL (ref 40–180)
HEMOGLOBIN: 15.2 G/DL (ref 13.2–17.1)
IMMATURE GRANULOCYTES: 0 % (ref 0–2)
IMMATURE GRANULOCYTES: 0 10*3/UL (ref 0–0.1)
KETONES, URINE: NEGATIVE MG/DL
LDL CHOLESTEROL CALCULATED: 139 MG/DL (ref 0–100)
LEUKOCYTE ESTERASE, URINE: NEGATIVE
LEUKOCYTES, UA: 1 /HPF (ref 0–5)
LYMPHOCYTES ABSOLUTE: 2.24 10*3/UL (ref 0.8–3.9)
LYMPHOCYTES RELATIVE PERCENT: 42.1 %
MCH RBC QN AUTO: 30.8 PG (ref 27–33)
MCHC RBC AUTO-ENTMCNC: 33.8 G/DL (ref 30–36)
MCV RBC AUTO: 91.3 FL (ref 80–100)
MONOCYTES ABSOLUTE: 0.37 10*3/UL (ref 0.2–0.9)
MONOCYTES RELATIVE PERCENT: 7 %
MUCUS: PRESENT /HPF
NEUTROPHILS ABSOLUTE: 2.5 10*3/UL (ref 1.5–7.8)
NITRITE, URINE: NEGATIVE
NONHDLC SERPL-MCNC: 169 MG/DL (ref 0–129)
PDW BLD-RTO: 13.3 % (ref 11–15)
PH UA: 6.5 (ref 5–8)
PLATELET # BLD: 288 10*3/UL (ref 140–400)
PMV BLD AUTO: 10.3 FL (ref 9–13)
POTASSIUM SERPL-SCNC: 4.3 MMOL/L (ref 3.5–5.1)
PROTEIN UA: 10 MG/DL
RBC # BLD: 4.93 10*6/UL (ref 4.2–5.8)
RBC UA: 1 /HPF (ref 0–3)
SEGMENTED NEUTROPHILS RELATIVE PERCENT: 46.9 %
SODIUM BLD-SCNC: 140 MMOL/L (ref 135–146)
SPECIFIC GRAVITY UA: 1.02 (ref 1–1.03)
TOTAL PROTEIN: 7.1 G/DL (ref 6–8)
TRIGL SERPL-MCNC: 150 MG/DL (ref 0–149)
TSH SERPL DL<=0.05 MIU/L-ACNC: 2 UIU/ML (ref 0.35–4.94)
UROBILINOGEN, URINE: <2 MG/DL
WBC # BLD: 5.32 10*3/UL (ref 3.8–10.8)

## 2024-04-10 ENCOUNTER — TELEPHONE (OUTPATIENT)
Dept: INTERNAL MEDICINE CLINIC | Age: 44
End: 2024-04-10

## 2024-04-10 DIAGNOSIS — E78.00 PURE HYPERCHOLESTEROLEMIA: Primary | ICD-10-CM

## 2024-04-10 DIAGNOSIS — Z00.00 WELL ADULT EXAM: ICD-10-CM

## 2024-04-10 NOTE — TELEPHONE ENCOUNTER
Pt would like yearly labs put in for his yearly labs out in for his appt 4-25-24.     Pt is going to an outside lab and needs to be called when orders are made.     Please call and advise

## 2024-04-19 LAB
A/G RATIO: 1.5 (ref 1–2.1)
ALBUMIN: 4.2 G/DL (ref 3.5–5)
ALP BLD-CCNC: 65 U/L (ref 33–140)
ALT SERPL-CCNC: 18 U/L (ref 0–60)
ANION GAP SERPL CALCULATED.3IONS-SCNC: 7 MMOL/L (ref 5–13)
AST SERPL-CCNC: 25 U/L (ref 0–40)
BASOPHILS ABSOLUTE: 0.02 10*3/UL (ref 0–0.2)
BASOPHILS RELATIVE PERCENT: 0.4 %
BILIRUB SERPL-MCNC: 0.6 MG/DL (ref 0.2–1.2)
BILIRUBIN URINE: NEGATIVE
BLOOD, URINE: NEGATIVE
BUN / CREAT RATIO: 11
BUN BLDV-MCNC: 12 MG/DL (ref 7–25)
CALCIUM SERPL-MCNC: 9.7 MG/DL (ref 8.5–10.5)
CHLORIDE BLD-SCNC: 107 MMOL/L (ref 98–110)
CHOLESTEROL, TOTAL: 253 MG/DL (ref 125–199)
CLARITY: CLEAR
CO2: 23 MMOL/L (ref 22–29)
COLOR: YELLOW
CREAT SERPL-MCNC: 1.12 MG/DL (ref 0.5–1.3)
EGFR (CKD-EPI): 85 SEE NOTE
EOSINOPHILS ABSOLUTE: 0.14 10*3/UL (ref 0–0.5)
EOSINOPHILS RELATIVE PERCENT: 2.9 %
GLOBULIN: 2.8 G/DL (ref 2–3.7)
GLUCOSE BLD-MCNC: 89 MG/DL (ref 71–99)
GLUCOSE URINE: NEGATIVE MG/DL
GRANULOCYTE ABSOLUTE COUNT: 2.18 10*3/UL (ref 1.5–7.8)
HCT VFR BLD CALC: 45.9 % (ref 40–51)
HDLC SERPL-MCNC: 50 MG/DL (ref 40–180)
HEMOGLOBIN: 15.7 G/DL (ref 13.2–17.1)
IMMATURE GRANULOCYTES %: 0.01 10*3/UL (ref 0–0.1)
IMMATURE GRANULOCYTES %: 0.2 % (ref 0–2)
KETONES, URINE: NEGATIVE MG/DL
LDL CHOLESTEROL CALCULATED: 182 MG/DL (ref 0–100)
LEUKOCYTE ESTERASE, URINE: NEGATIVE
LEUKOCYTES, BLD: 4.83 10*3/UL (ref 3.8–10.8)
LYMPHOCYTES ABSOLUTE: 2.11 10*3/UL (ref 0.8–3.9)
LYMPHOCYTES RELATIVE PERCENT: 43.7 %
MCH RBC QN AUTO: 31.5 PG (ref 27–33)
MCHC RBC AUTO-ENTMCNC: 34.2 G/DL (ref 30–36)
MCV RBC AUTO: 92.2 FL (ref 80–100)
MONOCYTES ABSOLUTE: 0.37 10*3/UL (ref 0.2–0.9)
MONOCYTES RELATIVE PERCENT: 7.7 %
NEUTROPHILS RELATIVE PERCENT: 45.1 %
NITRITE, URINE: NEGATIVE
NONHDLC SERPL-MCNC: 203 MG/DL (ref 0–129)
NUCLEATED RED BLOOD CELLS: 0 /100 WBC (ref 0–0)
PDW BLD-RTO: 13.5 % (ref 11–15)
PH, URINE: 6.5 (ref 5–8)
PLATELET # BLD: 268 10*3/UL (ref 140–400)
PMV BLD AUTO: 10 FL (ref 9–13)
POTASSIUM SERPL-SCNC: 4.4 MMOL/L (ref 3.5–5.1)
PROTEIN FLUID: 7 G/DL (ref 6–8)
PROTEIN UA: NEGATIVE MG/DL
RBC # BLD: 4.98 10*6/UL (ref 4.2–5.8)
SODIUM BLD-SCNC: 137 MMOL/L (ref 135–146)
SPECIFIC GRAVITY UA: 1.01 (ref 1–1.03)
TRIGL SERPL-MCNC: 103 MG/DL (ref 0–149)
TSH SERPL DL<=0.05 MIU/L-ACNC: 1.7 UIU/ML (ref 0.35–4.94)
URINE CULTURE PENDING: NORMAL
UROBILINOGEN, URINE: <2 MG/DL

## 2024-04-22 DIAGNOSIS — E78.00 PURE HYPERCHOLESTEROLEMIA: Primary | ICD-10-CM

## 2024-04-24 ASSESSMENT — PATIENT HEALTH QUESTIONNAIRE - PHQ9
2. FEELING DOWN, DEPRESSED OR HOPELESS: NOT AT ALL
SUM OF ALL RESPONSES TO PHQ9 QUESTIONS 1 & 2: 0
SUM OF ALL RESPONSES TO PHQ QUESTIONS 1-9: 0
SUM OF ALL RESPONSES TO PHQ QUESTIONS 1-9: 0
SUM OF ALL RESPONSES TO PHQ9 QUESTIONS 1 & 2: 0
1. LITTLE INTEREST OR PLEASURE IN DOING THINGS: NOT AT ALL
SUM OF ALL RESPONSES TO PHQ QUESTIONS 1-9: 0
SUM OF ALL RESPONSES TO PHQ QUESTIONS 1-9: 0
2. FEELING DOWN, DEPRESSED OR HOPELESS: NOT AT ALL
1. LITTLE INTEREST OR PLEASURE IN DOING THINGS: NOT AT ALL

## 2024-04-25 ENCOUNTER — OFFICE VISIT (OUTPATIENT)
Dept: INTERNAL MEDICINE CLINIC | Age: 44
End: 2024-04-25
Payer: COMMERCIAL

## 2024-04-25 VITALS
HEIGHT: 74 IN | OXYGEN SATURATION: 98 % | DIASTOLIC BLOOD PRESSURE: 86 MMHG | HEART RATE: 68 BPM | TEMPERATURE: 98.6 F | SYSTOLIC BLOOD PRESSURE: 120 MMHG | WEIGHT: 221 LBS | BODY MASS INDEX: 28.36 KG/M2

## 2024-04-25 DIAGNOSIS — Z00.00 PE (PHYSICAL EXAM), ANNUAL: Primary | ICD-10-CM

## 2024-04-25 DIAGNOSIS — E78.5 HYPERLIPIDEMIA, UNSPECIFIED HYPERLIPIDEMIA TYPE: ICD-10-CM

## 2024-04-25 DIAGNOSIS — L40.9 PSORIASIS: ICD-10-CM

## 2024-04-25 DIAGNOSIS — E78.00 PURE HYPERCHOLESTEROLEMIA: ICD-10-CM

## 2024-04-25 PROCEDURE — 99396 PREV VISIT EST AGE 40-64: CPT | Performed by: INTERNAL MEDICINE

## 2024-04-25 NOTE — PROGRESS NOTES
Annual Wellness Visit     Patient:  Chu Lawson                                               : 1980  Age: 44 y.o.  MRN: [id number]  Date : 2024      CHIEF COMPLAINT: Chu Lawson is a 44 y.o. male who presents for : Physical exam    1. Hyperlipidemia, unspecified hyperlipidemia type  History of borderline hyperlipidemia predominantly triglyceride in the past has been working out regularly but has a family history of hyper lipidemia  - Lipid Panel; Future    2. Pure hypercholesterolemia  As above    3. Psoriasis  This problem is stable  Physical exam generally feels good denies any chest pain shortness of breath or any other problem      Patient Active Problem List    Diagnosis Date Noted    Pure hypercholesterolemia 2020    Vitamin D deficiency 2012    Arthritis of knee, left 2012    Psoriasis 2011       Constitutional:  Denies fever or chills   Eyes:  Denies change in visual acuity   HENT:  Denies nasal congestion or sore throat   Respiratory:  Denies cough or shortness of breath   Cardiovascular:  Denies chest pain or edema   GI:  Denies abdominal pain, nausea, vomiting, bloody stools or diarrhea   :  Denies dysuria   Musculoskeletal:  Denies back pain or joint pain   Integument:  Denies rash   Neurologic:  Denies headache, focal weakness or sensory changes   Endocrine:  Denies polyuria or polydipsia   Lymphatic:  Denies swollen glands   Psychiatric:  Denies depression or anxiety     Past Medical History:        Diagnosis Date    Arthritis of knee, left 2012    Hyperglycemia 2020    Psoriasis     Pure hypercholesterolemia 2020       Past Surgical History:        Procedure Laterality Date    KNEE ARTHROSCOPY Left     VASECTOMY         Family History:  Family History   Problem Relation Age of Onset    Heart Disease Father     High Cholesterol Father     High Blood Pressure Father     High Blood Pressure Brother     Other Father         impaired fasting

## 2025-06-03 ENCOUNTER — TELEPHONE (OUTPATIENT)
Dept: INTERNAL MEDICINE CLINIC | Age: 45
End: 2025-06-03

## 2025-06-03 DIAGNOSIS — Z00.00 PE (PHYSICAL EXAM), ANNUAL: ICD-10-CM

## 2025-06-03 DIAGNOSIS — E78.5 HYPERLIPIDEMIA, UNSPECIFIED HYPERLIPIDEMIA TYPE: Primary | ICD-10-CM

## 2025-06-03 DIAGNOSIS — E78.00 PURE HYPERCHOLESTEROLEMIA: ICD-10-CM

## 2025-06-03 NOTE — TELEPHONE ENCOUNTER
Pt has upcoming physical would like lab work faxed to [facility] lab services to have lab done before appointment.  [facility]'s phone # [phone]

## 2025-06-05 ENCOUNTER — RESULTS FOLLOW-UP (OUTPATIENT)
Dept: INTERNAL MEDICINE CLINIC | Age: 45
End: 2025-06-05

## 2025-06-05 LAB
A/G RATIO: 1.5 (ref 1–2.1)
ALBUMIN: 4.3 G/DL (ref 3.5–5)
ALP BLD-CCNC: 77 U/L (ref 33–140)
ALT SERPL-CCNC: 33 U/L (ref 0–60)
ANION GAP SERPL CALCULATED.3IONS-SCNC: 6 MMOL/L (ref 5–13)
AST SERPL-CCNC: 33 U/L (ref 0–40)
BASOPHILS ABSOLUTE: 0.03 10*3/UL (ref 0–0.2)
BASOPHILS RELATIVE PERCENT: 0.6 %
BILIRUB SERPL-MCNC: 0.7 MG/DL (ref 0.2–1.2)
BILIRUBIN, URINE: NEGATIVE
BLOOD, URINE: NEGATIVE
BUN / CREAT RATIO: 15
BUN BLDV-MCNC: 15 MG/DL (ref 7–25)
CALCIUM SERPL-MCNC: 9.2 MG/DL (ref 8.5–10.5)
CHLORIDE BLD-SCNC: 105 MMOL/L (ref 98–110)
CHOLESTEROL, TOTAL: 224 MG/DL (ref 125–199)
CLARITY, UA: CLEAR
CO2: 27 MMOL/L (ref 22–29)
COLOR, UA: YELLOW
CREAT SERPL-MCNC: 1.01 MG/DL (ref 0.5–1.3)
EGFR (CKD-EPI): 95 SEE NOTE
EOSINOPHILS ABSOLUTE: 0.21 10*3/UL (ref 0–0.5)
EOSINOPHILS RELATIVE PERCENT: 4.2 %
GLOBULIN: 2.9 G/DL (ref 2–3.7)
GLUCOSE BLD-MCNC: 100 MG/DL (ref 71–99)
GLUCOSE URINE: NEGATIVE MG/DL
GRANULOCYTE ABSOLUTE COUNT: 2.06 10*3/UL (ref 1.5–7.8)
HCT VFR BLD CALC: 45.5 % (ref 40–51)
HDLC SERPL-MCNC: 54 MG/DL (ref 40–180)
HEMOGLOBIN: 15.2 G/DL (ref 13.2–17.1)
IMMATURE GRANULOCYTES %: 0 %
IMMATURE GRANULOCYTES %: 0 10*3/UL (ref 0–0.1)
KETONES, URINE: NEGATIVE MG/DL
LDL CHOLESTEROL: 155 MG/DL (ref 0–100)
LEUKOCYTE ESTERASE, URINE: NEGATIVE
LEUKOCYTES, BLD: 4.97 10*3/UL (ref 4–12)
LYMPHOCYTES ABSOLUTE: 2.28 10*3/UL (ref 0.8–3.9)
LYMPHOCYTES RELATIVE PERCENT: 45.9 %
MCH RBC QN AUTO: 30.3 PG (ref 27–33)
MCHC RBC AUTO-ENTMCNC: 33.4 G/DL (ref 30–36)
MCV RBC AUTO: 90.8 FL (ref 80–100)
MONOCYTES ABSOLUTE: 0.39 10*3/UL (ref 0.2–0.9)
MONOCYTES RELATIVE PERCENT: 7.8 %
NEUTROPHILS RELATIVE PERCENT: 41.5 %
NITRITE, URINE: NEGATIVE
NONHDLC SERPL-MCNC: 170 MG/DL (ref 0–129)
NUCLEATED RED BLOOD CELLS: 0 /100 WBC (ref 0–0)
PDW BLD-RTO: 13 % (ref 11–15)
PH, URINE: 7.5 (ref 5–8)
PLATELET # BLD: 286 10*3/UL (ref 140–400)
PMV BLD AUTO: 9.9 FL (ref 9–13)
POTASSIUM SERPL-SCNC: 4.4 MMOL/L (ref 3.5–5.1)
PROTEIN FLUID: 7.2 G/DL (ref 6–8)
PROTEIN UA: NEGATIVE MG/DL
RBC # BLD: 5.01 10*6/UL (ref 4.2–5.8)
SODIUM BLD-SCNC: 138 MMOL/L (ref 135–146)
SPECIFIC GRAVITY UA: 1.02 (ref 1–1.03)
TRIGL SERPL-MCNC: 75 MG/DL (ref 0–149)
TSH ULTRASENSITIVE: 2.08 UIU/ML (ref 0.35–4.94)
UROBILINOGEN, URINE: <2 MG/DL

## 2025-06-13 ENCOUNTER — OFFICE VISIT (OUTPATIENT)
Dept: INTERNAL MEDICINE CLINIC | Age: 45
End: 2025-06-13
Payer: COMMERCIAL

## 2025-06-13 VITALS
SYSTOLIC BLOOD PRESSURE: 124 MMHG | HEIGHT: 73 IN | TEMPERATURE: 97.1 F | BODY MASS INDEX: 29.48 KG/M2 | OXYGEN SATURATION: 98 % | DIASTOLIC BLOOD PRESSURE: 62 MMHG | WEIGHT: 222.4 LBS | HEART RATE: 61 BPM

## 2025-06-13 DIAGNOSIS — L40.9 PSORIASIS: ICD-10-CM

## 2025-06-13 DIAGNOSIS — Z00.00 PE (PHYSICAL EXAM), ANNUAL: Primary | ICD-10-CM

## 2025-06-13 DIAGNOSIS — E78.00 PURE HYPERCHOLESTEROLEMIA: ICD-10-CM

## 2025-06-13 DIAGNOSIS — E55.9 VITAMIN D DEFICIENCY: ICD-10-CM

## 2025-06-13 PROCEDURE — 99396 PREV VISIT EST AGE 40-64: CPT | Performed by: INTERNAL MEDICINE

## 2025-06-13 RX ORDER — AZITHROMYCIN 250 MG/1
TABLET, FILM COATED ORAL
Qty: 6 TABLET | Refills: 0 | Status: SHIPPED | OUTPATIENT
Start: 2025-06-13 | End: 2025-06-23

## 2025-06-13 RX ORDER — LANOLIN ALCOHOL/MO/W.PET/CERES
400 CREAM (GRAM) TOPICAL DAILY
COMMUNITY

## 2025-06-13 SDOH — ECONOMIC STABILITY: FOOD INSECURITY: WITHIN THE PAST 12 MONTHS, THE FOOD YOU BOUGHT JUST DIDN'T LAST AND YOU DIDN'T HAVE MONEY TO GET MORE.: NEVER TRUE

## 2025-06-13 SDOH — ECONOMIC STABILITY: FOOD INSECURITY: WITHIN THE PAST 12 MONTHS, YOU WORRIED THAT YOUR FOOD WOULD RUN OUT BEFORE YOU GOT MONEY TO BUY MORE.: NEVER TRUE

## 2025-06-13 ASSESSMENT — PATIENT HEALTH QUESTIONNAIRE - PHQ9
1. LITTLE INTEREST OR PLEASURE IN DOING THINGS: NOT AT ALL
SUM OF ALL RESPONSES TO PHQ9 QUESTIONS 1 & 2: 0
SUM OF ALL RESPONSES TO PHQ QUESTIONS 1-9: 0
SUM OF ALL RESPONSES TO PHQ QUESTIONS 1-9: 0
2. FEELING DOWN, DEPRESSED OR HOPELESS: NOT AT ALL
2. FEELING DOWN, DEPRESSED OR HOPELESS: NOT AT ALL
SUM OF ALL RESPONSES TO PHQ QUESTIONS 1-9: 0
1. LITTLE INTEREST OR PLEASURE IN DOING THINGS: NOT AT ALL
SUM OF ALL RESPONSES TO PHQ QUESTIONS 1-9: 0

## 2025-06-13 NOTE — PROGRESS NOTES
Annual Wellness Visit     Patient:  Chu Lawson                                               : 1980  Age: 45 y.o.  MRN: [id number]  Date : 2025      CHIEF COMPLAINT: Chu Lawson is a 45 y.o. male who presents for : Physical exam    1. Psoriasis  This problem is stable on topical meds    2. Pure hypercholesterolemia  Watch his diet and exercise regularly    3. Vitamin D deficiency      4. PE (physical exam), annual  Generally feels good denies any chest pain shortness of breath or any other problems exercises regularly  - azithromycin (ZITHROMAX) 250 MG tablet; 500mg on day 1 followed by 250mg on days 2 - 5  Dispense: 6 tablet; Refill: 0        Patient Active Problem List    Diagnosis Date Noted    Pure hypercholesterolemia 2020    Vitamin D deficiency 2012    Arthritis of knee, left 2012    Psoriasis 2011       Constitutional:  Denies fever or chills   Eyes:  Denies change in visual acuity   HENT:  Denies nasal congestion or sore throat   Respiratory:  Denies cough or shortness of breath   Cardiovascular:  Denies chest pain or edema   GI:  Denies abdominal pain, nausea, vomiting, bloody stools or diarrhea   :  Denies dysuria   Musculoskeletal:  Denies back pain or joint pain   Integument:  Denies rash   Neurologic:  Denies headache, focal weakness or sensory changes   Endocrine:  Denies polyuria or polydipsia   Lymphatic:  Denies swollen glands   Psychiatric:  Denies depression or anxiety     Past Medical History:        Diagnosis Date    Arthritis of knee, left 2012    Hyperglycemia 2020    Psoriasis     Pure hypercholesterolemia 2020       Past Surgical History:        Procedure Laterality Date    KNEE ARTHROSCOPY Left     VASECTOMY         Family History:  Family History   Problem Relation Age of Onset    Heart Disease Father     High Cholesterol Father     High Blood Pressure Father     High Blood Pressure Brother     Other Father         impaired